# Patient Record
Sex: MALE | Race: WHITE | NOT HISPANIC OR LATINO | Employment: UNEMPLOYED | URBAN - METROPOLITAN AREA
[De-identification: names, ages, dates, MRNs, and addresses within clinical notes are randomized per-mention and may not be internally consistent; named-entity substitution may affect disease eponyms.]

---

## 2017-02-21 ENCOUNTER — ALLSCRIPTS OFFICE VISIT (OUTPATIENT)
Dept: OTHER | Facility: OTHER | Age: 57
End: 2017-02-21

## 2017-03-16 ENCOUNTER — GENERIC CONVERSION - ENCOUNTER (OUTPATIENT)
Dept: OTHER | Facility: OTHER | Age: 57
End: 2017-03-16

## 2017-03-28 ENCOUNTER — ALLSCRIPTS OFFICE VISIT (OUTPATIENT)
Dept: OTHER | Facility: OTHER | Age: 57
End: 2017-03-28

## 2017-03-28 DIAGNOSIS — R53.83 OTHER FATIGUE: ICD-10-CM
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
## 2017-09-18 ENCOUNTER — GENERIC CONVERSION - ENCOUNTER (OUTPATIENT)
Dept: OTHER | Facility: OTHER | Age: 57
End: 2017-09-18

## 2017-09-18 DIAGNOSIS — Z12.5 ENCOUNTER FOR SCREENING FOR MALIGNANT NEOPLASM OF PROSTATE: ICD-10-CM

## 2017-09-18 DIAGNOSIS — I10 ESSENTIAL (PRIMARY) HYPERTENSION: ICD-10-CM

## 2017-09-18 DIAGNOSIS — E78.00 PURE HYPERCHOLESTEROLEMIA: ICD-10-CM

## 2018-01-11 NOTE — PROGRESS NOTES
Assessment    1  Neck pain (723 1) (M54 2)   2  Benign essential hypertension (401 1) (I10)    Plan  Benign essential hypertension    · Gabapentin 300 MG Oral Capsule; TAKE 1 CAPSULE TWICE DAILY   · *1 - [facility] Physical Therapy  Consult  Status: Hold For - Scheduling  Requested for:  21Apr2016  Care Summary provided  : Yes   · Follow-up visit in 1 month Evaluation and Treatment  Follow-up  Status: Hold For - Scheduling   Requested for: 21Apr2016    Discussion/Summary  Discussion Summary:   Awaiting MRI report  add gabapentin ,PT  Counseling Documentation With Imm: The patient was counseled regarding diagnostic results, instructions for management, risk factor reductions, prognosis  Chief Complaint  Chief Complaint Free Text Note Form: Pt is here for a 1 wk f/u for neck pain  Pt had an MRI last night @ [facility] - pt has a CD  Pt is still having numbness and tingling  Meds have helped with the pain  History of Present Illness  Neck Pain (Follow-Up): The patient is being seen for follow-up of neck pain and cervical disc disease  The patient reports no change in the condition  There are no comorbid illnesses  Interval symptoms:  improved neck pain, improved neck stiffness, denies headache, worsened upper extremity pain, worsened upper extremity paresthesias and stable   Associated symptoms: no incontinence  Medications:  the patient is adherent to his medication regimen, but he denies medication side effects  Review of Systems  Complete-Male:   Constitutional: feeling tired  Eyes: No complaints of eye pain, no red eyes, no discharge from eyes, no itchy eyes  ENT: no complaints of earache, no hearing loss, no nosebleeds, no nasal discharge, no sore throat, no hoarseness  Cardiovascular: No complaints of slow heart rate, no fast heart rate, no chest pain, no palpitations, no leg claudication, no lower extremity     Respiratory: No complaints of shortness of breath, no wheezing, no cough, no SOB on exertion, no orthopnea or PND  Gastrointestinal: No complaints of abdominal pain, no constipation, no nausea or vomiting, no diarrhea or bloody stools  Musculoskeletal: as noted in HPI  Neurological: numbness, tingling and limb weakness  Psychiatric: Is not suicidal, no sleep disturbances, no anxiety or depression, no change in personality, no emotional problems  Active Problems    1  Accelerated essential hypertension (401 0) (I10)   2  Benign essential hypertension (401 1) (I10)   3  Bilateral knee pain (719 46) (M25 561,M25 562)   4  Headache (784 0) (R51)   5  Hypercholesterolemia (272 0) (E78 0)   6  Neck pain (723 1) (M54 2)   7  Need for prophylactic vaccination and inoculation against influenza (V04 81) (Z23)   8  Screening for depression (V79 0) (Z13 89)    Past Medical History    1  History of Colonoscopy (Fiberoptic) Screening   2  History of Hearing Loss (389 9)   3  History of influenza (V12 09) (Z87 09)  Active Problems And Past Medical History Reviewed: The active problems and past medical history were reviewed and updated today  Surgical History    1  History of Hernia Repair  Surgical History Reviewed: The surgical history was reviewed and updated today  Family History  Mother    1  Family history of Diabetes  Grandfather    2  Family history of Diabetes  Family History    3  Family history of Denial Of Any Significant Medical History  Family History Reviewed: The family history was reviewed and updated today  Social History    · Denied: History of Alcohol Use (History)   · Denied: History of Drug Use   · Never A Smoker  Social History Reviewed: The social history was reviewed and updated today  Current Meds   1  Methocarbamol 500 MG Oral Tablet; TAKE 1 TABLET 3 TIMES DAILY; Therapy: 14Apr2016 to (Complete:[id number])  Requested for: 14Apr2016; Last Rx:14Apr2016   Ordered   2   Nabumetone 750 MG Oral Tablet; TAKE 1 TABLET EVERY 12 HOURS DAILY; Therapy: 14Apr2016 to (Evaluate:86Khd9561)  Requested for: 14Apr2016; Last Rx:14Apr2016   Ordered   3  Valsartan 80 MG Oral Tablet; TAKE 1 TABLET DAILY; Therapy: 91NME9578 to (Evaluate:11Jun2016)  Requested for: 13YWT1711; Last Rx:10Goo8872   Ordered  Medication List Reviewed: The medication list was reviewed and updated today  Allergies    1  Lisinopril TABS    2  No Known Environmental Allergies   3  No Known Food Allergies    Vitals  Vital Signs [Data Includes: Current Encounter]    Recorded: 21Apr2016 09:53AM   Temperature 98 5 F, Oral   Heart Rate 72   Systolic 516, LUE, Sitting   Diastolic 88, LUE, Sitting   BP Cuff Size Large   Height 6 ft 4 in   Weight 311 lb 4 oz   BMI Calculated 37 89   BSA Calculated 2 67   O2 Saturation 98, RA     Physical Exam    Eyes   Conjunctiva and lids: No swelling, erythema, or discharge  Ears, Nose, Mouth, and Throat   External inspection of ears and nose: Normal     Oropharynx: Normal with no erythema, edema, exudate or lesions  Pulmonary   Respiratory effort: No increased work of breathing or signs of respiratory distress  Auscultation of lungs: Clear to auscultation, equal breath sounds bilaterally, no wheezes, no rales, no rhonci  Cardiovascular   Auscultation of heart: Normal rate and rhythm, normal S1 and S2, without murmurs  Examination of extremities for edema and/or varicosities: Normal     Carotid pulses: Normal     Abdomen   Abdomen: Non-tender, no masses  Musculoskeletal   Gait and station: Normal   limited neck movement  tenderness over base of neck  decrease strength of lt  hand  Neurologic   Cranial nerves: Cranial nerves 2-12 intact  Reflexes: Abnormal   Deep tendon reflexes: 3+ left biceps and 3+ left brachioradialis  Psychiatric   Orientation to person, place and time: Normal          Health Management  Screening for depression   *VB-Depression Screening; every 1 year; Last [id number];  Next Due: 28IJA7208; Active    Future Appointments    Date/Time Provider Specialty Site   04/25/2016 01:30 PM [facility]     Signatures   Electronically signed by :  Christina Thorne MD; Apr 21 2016 10:16AM EST                       (Author)

## 2018-01-12 VITALS
HEART RATE: 73 BPM | BODY MASS INDEX: 36.47 KG/M2 | SYSTOLIC BLOOD PRESSURE: 120 MMHG | HEIGHT: 76 IN | DIASTOLIC BLOOD PRESSURE: 80 MMHG | OXYGEN SATURATION: 98 % | WEIGHT: 299.5 LBS | TEMPERATURE: 97.1 F

## 2018-01-13 VITALS
DIASTOLIC BLOOD PRESSURE: 94 MMHG | WEIGHT: 301 LBS | HEART RATE: 65 BPM | HEIGHT: 76 IN | TEMPERATURE: 97 F | OXYGEN SATURATION: 98 % | SYSTOLIC BLOOD PRESSURE: 148 MMHG | BODY MASS INDEX: 36.65 KG/M2

## 2018-01-22 VITALS
OXYGEN SATURATION: 98 % | TEMPERATURE: 97.9 F | BODY MASS INDEX: 38.36 KG/M2 | WEIGHT: 315 LBS | HEIGHT: 76 IN | HEART RATE: 67 BPM | SYSTOLIC BLOOD PRESSURE: 132 MMHG | DIASTOLIC BLOOD PRESSURE: 80 MMHG

## 2018-04-06 ENCOUNTER — OFFICE VISIT (OUTPATIENT)
Dept: INTERNAL MEDICINE CLINIC | Facility: CLINIC | Age: 58
End: 2018-04-06
Payer: COMMERCIAL

## 2018-04-06 VITALS
HEART RATE: 84 BPM | OXYGEN SATURATION: 95 % | SYSTOLIC BLOOD PRESSURE: 136 MMHG | WEIGHT: 315 LBS | TEMPERATURE: 98.1 F | DIASTOLIC BLOOD PRESSURE: 88 MMHG | BODY MASS INDEX: 39.17 KG/M2 | HEIGHT: 75 IN

## 2018-04-06 DIAGNOSIS — I10 BENIGN ESSENTIAL HYPERTENSION: ICD-10-CM

## 2018-04-06 DIAGNOSIS — E78.5 DYSLIPIDEMIA: ICD-10-CM

## 2018-04-06 DIAGNOSIS — I10 ESSENTIAL HYPERTENSION: Primary | ICD-10-CM

## 2018-04-06 DIAGNOSIS — R91.1 INCIDENTAL LUNG NODULE, > 3MM AND < 8MM: ICD-10-CM

## 2018-04-06 DIAGNOSIS — J01.90 ACUTE NON-RECURRENT SINUSITIS, UNSPECIFIED LOCATION: ICD-10-CM

## 2018-04-06 DIAGNOSIS — R35.1 NOCTURIA: ICD-10-CM

## 2018-04-06 DIAGNOSIS — K42.9 UMBILICAL HERNIA WITHOUT OBSTRUCTION AND WITHOUT GANGRENE: ICD-10-CM

## 2018-04-06 DIAGNOSIS — E66.01 OBESITY, CLASS III, BMI 40-49.9 (MORBID OBESITY) (HCC): ICD-10-CM

## 2018-04-06 DIAGNOSIS — R79.89 ELEVATED LIVER FUNCTION TESTS: ICD-10-CM

## 2018-04-06 PROBLEM — R53.83 FATIGUE: Status: RESOLVED | Noted: 2017-03-28 | Resolved: 2018-04-06

## 2018-04-06 PROBLEM — R53.83 FATIGUE: Status: ACTIVE | Noted: 2017-03-28

## 2018-04-06 PROCEDURE — 99214 OFFICE O/P EST MOD 30 MIN: CPT | Performed by: PHYSICIAN ASSISTANT

## 2018-04-06 RX ORDER — FLUTICASONE PROPIONATE 50 MCG
1 SPRAY, SUSPENSION (ML) NASAL DAILY
Qty: 16 G | Refills: 0 | Status: SHIPPED | OUTPATIENT
Start: 2018-04-06

## 2018-04-06 RX ORDER — AMOXICILLIN AND CLAVULANATE POTASSIUM 875; 125 MG/1; MG/1
1 TABLET, FILM COATED ORAL EVERY 12 HOURS SCHEDULED
Qty: 20 TABLET | Refills: 0 | Status: SHIPPED | OUTPATIENT
Start: 2018-04-06 | End: 2018-04-16

## 2018-04-06 RX ORDER — VALSARTAN 160 MG/1
1 TABLET ORAL DAILY
COMMUNITY
Start: 2015-06-18 | End: 2018-04-06 | Stop reason: SDUPTHER

## 2018-04-06 RX ORDER — HYDROCHLOROTHIAZIDE 12.5 MG/1
12.5 TABLET ORAL DAILY
Qty: 90 TABLET | Refills: 1 | Status: SHIPPED | OUTPATIENT
Start: 2018-04-06

## 2018-04-06 RX ORDER — HYDROCHLOROTHIAZIDE 12.5 MG/1
1 TABLET ORAL DAILY
COMMUNITY
Start: 2017-02-21 | End: 2018-04-06 | Stop reason: SDUPTHER

## 2018-04-06 RX ORDER — VALSARTAN 160 MG/1
160 TABLET ORAL DAILY
Qty: 90 TABLET | Refills: 1 | Status: SHIPPED | OUTPATIENT
Start: 2018-04-06 | End: 2018-09-07 | Stop reason: ALTCHOICE

## 2018-04-06 NOTE — ASSESSMENT & PLAN NOTE
Previous CT scan of the chest was done showing a 3 mm right middle lobe nodule  Patient is a nonsmoker however does have previous exposure to asbestos remotely and previously had been in the [employer] for that reason will proceed CT to check stability

## 2018-04-06 NOTE — PROGRESS NOTES
[facility]  Standard Office Visit  Patient ID: Duy Rivers    : 1960  Age/Gender: 56 y o  male     DATE: 2018      Assessment/Plan:    Dyslipidemia  Encourage weight loss  Low-fat low-cholesterol diet  ASCVD score 7 2%  Not currently taking a statin  Discussed in addition to diet and lifestyle can use over-the-counter red yeast rice which is an over-the-counter supplement which can help improve cholesterol levels  Incidental lung nodule, > 3mm and < 8mm    Previous CT scan of the chest was done showing a 3 mm right middle lobe nodule  Patient is a nonsmoker however does have previous exposure to asbestos remotely and previously had been in the [employer] for that reason will proceed CT to check stability  Acute non-recurrent sinusitis  Start Augmentin to be taken twice daily as directed  Take in AM with food  Start Flonase 1 spray to each nostril each daily  Benign essential hypertension   Blood pressure well controlled at this time on hydrochlorothiazide with valsartan  No dose change  Encourage weight loss daily activity  Continue to keep regular scheduled cardiology follow-up    Nocturia    Patient follows regularly with urologist   He will keep our office informed of any change in treatment plan    Obesity, Class III, BMI 40-49 9 (morbid obesity) ([address] )   Weight loss can help improve blood pressure and cholesterol control  Umbilical hernia    No signs or symptoms of acute obstruction  Discussed surgical intervention which patient is not interested at this time  Discussed with patient red flag symptoms   Which need immediate evaluation and treatment should they develop  Increase fiber and fluids in your diet    Elevated liver function tests   Add chronic hepatitis panel the next lab set  Likely secondary to fatty liver    Will check CMP as well if continues to be elevated will check right upper quadrant ultrasound       Diagnoses and all orders for this visit:    Essential hypertension  -     hydrochlorothiazide (HYDRODIURIL) 12 5 mg tablet; Take 1 tablet (12 5 mg total) by mouth daily  -     valsartan (DIOVAN) 160 mg tablet; Take 1 tablet (160 mg total) by mouth daily    Benign essential hypertension  -     Comprehensive metabolic panel; Future    Dyslipidemia  -     Comprehensive metabolic panel; Future  -     Lipid panel; Future    Obesity, Class III, BMI 40-49 9 (morbid obesity) (HCC)    Incidental lung nodule, > 3mm and < 8mm  -     CT chest wo contrast; Future    Elevated liver function tests  -     Chronic Hepatitis Panel; Future    Acute non-recurrent sinusitis, unspecified location  -     amoxicillin-clavulanate (AUGMENTIN) 875-125 mg per tablet; Take 1 tablet by mouth every 12 (twelve) hours for 10 days  -     fluticasone (FLONASE) 50 mcg/act nasal spray; 1 spray into each nostril daily    Umbilical hernia without obstruction and without gangrene    Nocturia    Other orders  -     aspirin 81 MG tablet; Take 1 tablet by mouth daily  -     Discontinue: hydrochlorothiazide (HYDRODIURIL) 12 5 mg tablet; Take 1 tablet by mouth daily  -     Discontinue: valsartan (DIOVAN) 160 mg tablet; Take 1 tablet by mouth daily          Subjective:   Chief Complaint   Patient presents with    Follow-up     BLOOD WORK DONE 04/03/2018    Hypertension         Brody Roland is a 56 y o  male who presents to the office on 4/6/2018 for     57 yo male for follow up  Notes he has a scheduled cardiology follow-up  Notes that his children moved in with him and brought his grandchildren and this winter he has had multiple colds  Most recently has been for 3-4 weeks with sinus pain and pressure  Nasal congestion   Taking blood pressure medications as directed  No new complaints or concerns   Other than his cold  Does have a history of being in the [employer] as well as expects this exposure  He is a nonsmoker    History of lung nodule  Hypertension   This is a chronic problem  The current episode started more than 1 year ago  The problem has been resolved since onset  The problem is controlled  Associated symptoms include headaches  Pertinent negatives include no anxiety, blurred vision, chest pain, palpitations, peripheral edema or shortness of breath  Risk factors for coronary artery disease include male gender, obesity and dyslipidemia  The current treatment provides moderate improvement  There are no compliance problems  There is no history of angina or CAD/MI  Sinusitis   This is a recurrent problem  The current episode started 1 to 4 weeks ago  The problem has been waxing and waning since onset  There has been no fever  The pain is mild  Associated symptoms include ear pain, headaches, sinus pressure and a sore throat  Pertinent negatives include no chills, coughing or shortness of breath  The following portions of the patient's history were reviewed and updated as appropriate: allergies, current medications, past family history, past medical history, past social history, past surgical history and problem list     Review of Systems   Constitutional: Negative for chills  HENT: Positive for ear pain, sinus pressure and sore throat  Eyes: Negative for blurred vision  Respiratory: Negative for cough and shortness of breath  Cardiovascular: Negative for chest pain and palpitations  Follows with Cardiology  Has follow-up scheduled  Gastrointestinal: Negative for abdominal distention, constipation, diarrhea, nausea and vomiting  He notes he is up-to-date on his colonoscopy   Neurological: Positive for headaches           Patient Active Problem List   Diagnosis    Abnormal stress test    Benign essential hypertension    Dyslipidemia    EKG, abnormal    Incidental lung nodule, > 3mm and < 8mm    Obesity, Class III, BMI 40-49 9 (morbid obesity) ([facility])    Nocturia    Positive cardiac stress test  Acute non-recurrent sinusitis    Umbilical hernia    Elevated liver function tests       Past Medical History:   Diagnosis Date    Accelerated essential hypertension     last assessed 3/3/15, resolved 11/25/16    GERD (gastroesophageal reflux disease)     Hearing loss     last assessed 1/20/14, resolved 6/17/15    Obesity     Shingles        Past Surgical History:   Procedure Laterality Date    COLONOSCOPY      last assessed 3/31/14, resolved 6/17/15    HERNIA REPAIR      WISDOM TOOTH EXTRACTION           Current Outpatient Prescriptions:     aspirin 81 MG tablet, Take 1 tablet by mouth daily, Disp: , Rfl:     hydrochlorothiazide (HYDRODIURIL) 12 5 mg tablet, Take 1 tablet (12 5 mg total) by mouth daily, Disp: 90 tablet, Rfl: 1    valsartan (DIOVAN) 160 mg tablet, Take 1 tablet (160 mg total) by mouth daily, Disp: 90 tablet, Rfl: 1    amoxicillin-clavulanate (AUGMENTIN) 875-125 mg per tablet, Take 1 tablet by mouth every 12 (twelve) hours for 10 days, Disp: 20 tablet, Rfl: 0    fluticasone (FLONASE) 50 mcg/act nasal spray, 1 spray into each nostril daily, Disp: 16 g, Rfl: 0    Allergies   Allergen Reactions    Lisinopril Cough       Social History     Social History    Marital status: /Civil Union     Spouse name: N/A    Number of children: N/A    Years of education: N/A     Social History Main Topics    Smoking status: Never Smoker    Smokeless tobacco: Never Used    Alcohol use No    Drug use: No    Sexual activity: Not Asked     Other Topics Concern    None     Social History Narrative    None       Family History   Problem Relation Age of Onset    Hypertension Mother     No Known Problems Father     Diabetes Family        PHQ-9 Depression Screening    PHQ-9:    Frequency of the following problems over the past two weeks:              Health Maintenance   Topic Date Due    HIV SCREENING  1960    Hepatitis C Screening  1960    COLONOSCOPY  1960    Depression Screening PHQ-9  11/21/1974    DTaP,Tdap,and Td Vaccines (2 - Td) 02/21/2027    INFLUENZA VACCINE  Completed       Immunization History   Administered Date(s) Administered    Influenza 01/05/2015, 10/28/2015, 11/19/2016, 11/25/2016    Influenza Quadrivalent Preservative Free 3 years and older IM 11/25/2016, 09/18/2017    Influenza Quadrivalent, 6-35 Months IM 01/05/2015    Influenza TIV (IM) 10/28/2015    Tdap 02/21/2017        Objective:  Vitals:    04/06/18 1325   BP: 136/88   BP Location: Left arm   Patient Position: Sitting   Cuff Size: Large   Pulse: 84   Temp: 98 1 °F (36 7 °C)   TempSrc: Oral   SpO2: 95%   Weight: (!) 146 kg (322 lb 9 6 oz)   Height: 6' 3" (1 905 m)     Wt Readings from Last 3 Encounters:   04/06/18 (!) 146 kg (322 lb 9 6 oz)   09/18/17 (!) 145 kg (320 lb)   03/28/17 136 kg (299 lb 8 oz)     Body mass index is 40 32 kg/m²  No exam data present       Physical Exam   Constitutional: He is oriented to person, place, and time  He appears well-developed and well-nourished  No distress  Alert pleasant cooperative seated in room in no acute distress   HENT:   Head: Normocephalic and atraumatic  Mouth/Throat: No oropharyngeal exudate  Mild erythema of tonsils and posterior pharynx  No exudates  Right tonsillar asymmetry cryptic  No tonsil stones  Airway patent  Yellow clear nasal drainage with edema of nasal mucosa  Tenderness to palpation over maxillary sinuses  No frontal sinus tenderness to palpation  Extraocular eye movements intact  Eyes: EOM are normal  Pupils are equal, round, and reactive to light  Right eye exhibits no discharge  Left eye exhibits no discharge  No scleral icterus  Neck: Neck supple  Cardiovascular: Normal rate, regular rhythm and normal heart sounds  No murmur heard  Pulmonary/Chest: Effort normal and breath sounds normal  No respiratory distress  He has no wheezes     Clear to auscultation no crackles rhonchi wheeze   Abdominal: Soft  Bowel sounds are normal  There is no tenderness  There is no guarding  Soft nontender positive bowel sounds  Umbilical hernia normal overlying skin, nontender, positive bowel sounds  x4   Musculoskeletal: He exhibits no edema  No lower extremity edema   Neurological: He is alert and oriented to person, place, and time  Skin: Skin is warm and dry  He is not diaphoretic  Psychiatric: He has a normal mood and affect  His behavior is normal  Judgment and thought content normal    Nursing note and vitals reviewed            Future Appointments  Date Time Provider Isidra Jennifer   9/7/2018 1:00 PM Feliberto Jimenez PA-C [address] [place] [address]    Patient Care Team:  Lakesha Alegria MD as PCP - General  Feliberto Jimenez PA-C

## 2018-04-06 NOTE — ASSESSMENT & PLAN NOTE
Start Augmentin to be taken twice daily as directed  Take in AM with food  Start Flonase 1 spray to each nostril each daily

## 2018-04-06 NOTE — ASSESSMENT & PLAN NOTE
Encourage weight loss  Low-fat low-cholesterol diet  ASCVD score 7 2%  Not currently taking a statin  Discussed in addition to diet and lifestyle can use over-the-counter red yeast rice which is an over-the-counter supplement which can help improve cholesterol levels

## 2018-04-07 NOTE — ASSESSMENT & PLAN NOTE
Blood pressure well controlled at this time on hydrochlorothiazide with valsartan  No dose change  Encourage weight loss daily activity    Continue to keep regular scheduled cardiology follow-up

## 2018-04-07 NOTE — ASSESSMENT & PLAN NOTE
No signs or symptoms of acute obstruction  Discussed surgical intervention which patient is not interested at this time  Discussed with patient red flag symptoms   Which need immediate evaluation and treatment should they develop    Increase fiber and fluids in your diet

## 2018-04-07 NOTE — ASSESSMENT & PLAN NOTE
Patient follows regularly with urologist   He will keep our office informed of any change in treatment plan

## 2018-04-07 NOTE — ASSESSMENT & PLAN NOTE
Add chronic hepatitis panel the next lab set  Likely secondary to fatty liver    Will check CMP as well if continues to be elevated will check right upper quadrant ultrasound

## 2018-09-07 ENCOUNTER — OFFICE VISIT (OUTPATIENT)
Dept: INTERNAL MEDICINE CLINIC | Facility: CLINIC | Age: 58
End: 2018-09-07
Payer: COMMERCIAL

## 2018-09-07 VITALS
WEIGHT: 315 LBS | BODY MASS INDEX: 39.17 KG/M2 | DIASTOLIC BLOOD PRESSURE: 82 MMHG | TEMPERATURE: 97.9 F | HEART RATE: 77 BPM | OXYGEN SATURATION: 97 % | SYSTOLIC BLOOD PRESSURE: 116 MMHG | HEIGHT: 75 IN

## 2018-09-07 DIAGNOSIS — E78.5 DYSLIPIDEMIA: ICD-10-CM

## 2018-09-07 DIAGNOSIS — E66.01 OBESITY, CLASS III, BMI 40-49.9 (MORBID OBESITY) (HCC): ICD-10-CM

## 2018-09-07 DIAGNOSIS — Z78.9 NOT IMMUNE TO HEPATITIS B VIRUS: ICD-10-CM

## 2018-09-07 DIAGNOSIS — R91.1 INCIDENTAL LUNG NODULE, > 3MM AND < 8MM: ICD-10-CM

## 2018-09-07 DIAGNOSIS — I10 BENIGN ESSENTIAL HYPERTENSION: Primary | ICD-10-CM

## 2018-09-07 DIAGNOSIS — Z23 NEEDS FLU SHOT: ICD-10-CM

## 2018-09-07 DIAGNOSIS — R79.89 ELEVATED LIVER FUNCTION TESTS: ICD-10-CM

## 2018-09-07 PROBLEM — J01.90 ACUTE NON-RECURRENT SINUSITIS: Status: RESOLVED | Noted: 2018-04-06 | Resolved: 2018-09-07

## 2018-09-07 PROCEDURE — 90682 RIV4 VACC RECOMBINANT DNA IM: CPT | Performed by: PHYSICIAN ASSISTANT

## 2018-09-07 PROCEDURE — 99214 OFFICE O/P EST MOD 30 MIN: CPT | Performed by: PHYSICIAN ASSISTANT

## 2018-09-07 PROCEDURE — 90471 IMMUNIZATION ADMIN: CPT | Performed by: PHYSICIAN ASSISTANT

## 2018-09-07 RX ORDER — LOSARTAN POTASSIUM 50 MG/1
50 TABLET ORAL DAILY
Qty: 30 TABLET | Refills: 5 | Status: SHIPPED | OUTPATIENT
Start: 2018-09-07

## 2018-09-07 NOTE — ASSESSMENT & PLAN NOTE
Patient recently seen by Cardiology who switched him from valsartan to losartan  Blood pressure well controlled on losartan 50 mg along with hydrochlorothiazide 12 5 mg   Encouraged him to continue with weight loss  Gave encouragement

## 2018-09-07 NOTE — ASSESSMENT & PLAN NOTE
ASCVD score 6 3, encourage weight loss, low fat low cholesterol diet  Discussed OTC herbal red yeast rise supplement which can help with cholesterol levels

## 2018-09-07 NOTE — ASSESSMENT & PLAN NOTE
Reviewed laboratory studies  Hepatitis C negative as patient is in the [facility] population  No further hepatitis C testing is needed  Incidentally noted patient does not appear to be an immune from hepatitis B  He was in the [facility] and presumably has received the vaccination in the past however did not develop immunity  Discussed relax in a shin with patient  Patient notes he will check with his insurance provider as well as his  Wife prior to proceeding with re-vaccination    Discussed hepatitis-B transmission roots and risk factors

## 2018-09-07 NOTE — PATIENT INSTRUCTIONS
Elevated liver function tests  Ast alt and alk phos normal   Will resolved this issue  Will follow LFTs  Not immune to hepatitis B virus  Reviewed laboratory studies  Hepatitis C negative as patient is in the [place] population  No further hepatitis C testing is needed  Incidentally noted patient does not appear to be an immune from hepatitis B  He was in the [facility] and presumably has received the vaccination in the past however did not develop immunity  Discussed relax in a shin with patient  Patient notes he will check with his insurance provider as well as his  Wife prior to proceeding with re-vaccination  Discussed hepatitis-B transmission roots and risk factors    Incidental lung nodule, > 3mm and < 8mm  Repeat CT chest show stable lung nodule  Will follow    Dyslipidemia  ASCVD score 6 3, encourage weight loss, low fat low cholesterol diet  Discussed OTC herbal red yeast rise supplement which can help with cholesterol levels

## 2018-09-07 NOTE — PROGRESS NOTES
Ingrid Sellers [facility]  Standard Office Visit  Patient ID: Anamaria Mack    : 1960  Age/Gender: 56 y o  male     DATE: 2018      Assessment/Plan:    Elevated liver function tests  Ast alt and alk phos normal   Will resolved this issue  Will follow LFTs  Not immune to hepatitis B virus  Reviewed laboratory studies  Hepatitis C negative as patient is in the baby [place] population  No further hepatitis C testing is needed  Incidentally noted patient does not appear to be an immune from hepatitis B  He was in the [employer] and presumably has received the vaccination in the past however did not develop immunity  Discussed relax in a shin with patient  Patient notes he will check with his insurance provider as well as his  Wife prior to proceeding with re-vaccination  Discussed hepatitis-B transmission roots and risk factors    Incidental lung nodule, > 3mm and < 8mm  Repeat CT chest show stable lung nodule  Will follow    Dyslipidemia  ASCVD score 6 3, encourage weight loss, low fat low cholesterol diet  Discussed OTC herbal red yeast rise supplement which can help with cholesterol levels  Needs flu shot  Flu shot given today  Benign essential hypertension   Patient recently seen by Cardiology who switched him from valsartan to losartan  Blood pressure well controlled on losartan 50 mg along with hydrochlorothiazide 12 5 mg   Encouraged him to continue with weight loss  Gave encouragement  Diagnoses and all orders for this visit:    Benign essential hypertension  -     losartan (COZAAR) 50 mg tablet; Take 1 tablet (50 mg total) by mouth daily  -     Comprehensive metabolic panel; Future    Dyslipidemia  -     Lipid panel;  Future    Incidental lung nodule, > 3mm and < 8mm    Obesity, Class III, BMI 40-49 9 (morbid obesity) (HCC)    Elevated liver function tests    Not immune to hepatitis B virus    Needs flu shot Subjective:   Chief Complaint   Patient presents with    Hypertension     review labs and ct          Haroldo Hudson is a 56 y o  male who presents to the office on 9/7/2018 for      Follow-up  Patient recently had labs  He saw his cardiologist recently who switched him from  Valsartan to losartan  He has been taking losartan with hydrochlorothiazide as directed with good control  He notes his wife recently has lost a significant  Amount of weight through diet and lifestyle which has motivated him to lose weight himself  He recently got rid of snacks in the home and is trying to eat more healthy  He is taking all medications as directed  He notes his last visit with his cardiologist went well  He recently had a CT scan of his chest as well to follow up for lung nodules  He does not have a smoking history however does have a history of   experience with questionable asbestos  Exposure history  He follows regularly with his urologist   No longer having any urinary symptoms  moving his bowels regularly  Notes he does follow regularly for his colonoscopy  Does have an  Umbilical hernia but is not causing him any difficulties  No new complaints or concerns at this time  Feeling well  Wants to know if we have the flu shot in      Hypertension   This is a recurrent problem  The current episode started more than 1 year ago  The problem is controlled  Pertinent negatives include no anxiety, chest pain, headaches, palpitations, peripheral edema or shortness of breath  Risk factors for coronary artery disease include male gender, obesity and dyslipidemia  Past treatments include angiotensin blockers and diuretics  The current treatment provides significant improvement  There is no history of kidney disease or CAD/MI  There is no history of chronic renal disease         The following portions of the patient's history were reviewed and updated as appropriate: allergies, current medications, past family history, past medical history, past social history, past surgical history and problem list     Review of Systems   Respiratory: Negative for cough, shortness of breath and wheezing  Cardiovascular: Negative for chest pain and palpitations  Gastrointestinal: Negative for abdominal pain, diarrhea, nausea and vomiting  Genitourinary: Negative for dysuria  Skin: Negative for rash  Neurological: Negative for dizziness, light-headedness and headaches           Patient Active Problem List   Diagnosis    Abnormal stress test    Benign essential hypertension    Dyslipidemia    EKG, abnormal    Incidental lung nodule, > 3mm and < 8mm    Obesity, Class III, BMI 40-49 9 (morbid obesity) (HCC)    Nocturia    Positive cardiac stress test    Umbilical hernia    Not immune to hepatitis B virus    Needs flu shot       Past Medical History:   Diagnosis Date    Accelerated essential hypertension     last assessed 3/3/15, resolved 11/25/16    GERD (gastroesophageal reflux disease)     Hearing loss     last assessed 1/20/14, resolved 6/17/15    Obesity     Shingles        Past Surgical History:   Procedure Laterality Date    COLONOSCOPY      last assessed 3/31/14, resolved 6/17/15    HERNIA REPAIR      WISDOM TOOTH EXTRACTION           Current Outpatient Prescriptions:     aspirin 81 MG tablet, Take 1 tablet by mouth daily, Disp: , Rfl:     fluticasone (FLONASE) 50 mcg/act nasal spray, 1 spray into each nostril daily, Disp: 16 g, Rfl: 0    hydrochlorothiazide (HYDRODIURIL) 12 5 mg tablet, Take 1 tablet (12 5 mg total) by mouth daily, Disp: 90 tablet, Rfl: 1    losartan (COZAAR) 50 mg tablet, Take 1 tablet (50 mg total) by mouth daily, Disp: 30 tablet, Rfl: 5    Allergies   Allergen Reactions    Lisinopril Cough       Social History     Social History    Marital status: /Civil Union     Spouse name: N/A    Number of children: N/A    Years of education: N/A     Social History Main Topics  Smoking status: Never Smoker    Smokeless tobacco: Never Used    Alcohol use No    Drug use: No    Sexual activity: Not Asked     Other Topics Concern    None     Social History Narrative    None       Family History   Problem Relation Age of Onset    Hypertension Mother     No Known Problems Father     Diabetes Family        PHQ-9 Depression Screening    PHQ-9:    Frequency of the following problems over the past two weeks:       Little interest or pleasure in doing things:  0 - not at all  Feeling down, depressed, or hopeless:  0 - not at all  PHQ-2 Score:  0         Health Maintenance   Topic Date Due    Depression Screening PHQ  1960    INFLUENZA VACCINE  09/01/2018    CRC Screening: Colonoscopy  12/12/2024    DTaP,Tdap,and Td Vaccines (2 - Td) 02/21/2027       Immunization History   Administered Date(s) Administered    Influenza 01/05/2015, 10/28/2015, 11/19/2016, 11/25/2016    Influenza Quadrivalent Preservative Free 3 years and older IM 11/25/2016, 09/18/2017    Influenza Quadrivalent, 6-35 Months IM 01/05/2015    Influenza TIV (IM) 10/28/2015    Tdap 02/21/2017        Objective:  Vitals:    09/07/18 1301   BP: 116/82   BP Location: Right arm   Patient Position: Sitting   Cuff Size: Large   Pulse: 77   Temp: 97 9 °F (36 6 °C)   TempSrc: Oral   SpO2: 97%   Weight: (!) 145 kg (320 lb)   Height: 6' 3" (1 905 m)     Wt Readings from Last 3 Encounters:   09/07/18 (!) 145 kg (320 lb)   04/06/18 (!) 146 kg (322 lb 9 6 oz)   09/18/17 (!) 145 kg (320 lb)     Body mass index is 40 kg/m²  No exam data present       Physical Exam   Constitutional: He is oriented to person, place, and time  He appears well-developed and well-nourished  No distress  Alert pleasant cooperative  Seated in room in no acute distress   HENT:   Head: Normocephalic and atraumatic  Mouth/Throat: Oropharynx is clear and moist  No oropharyngeal exudate  Eyes: Pupils are equal, round, and reactive to light   Right eye exhibits no discharge  Left eye exhibits no discharge  No scleral icterus  Neck: Neck supple  Cardiovascular: Normal rate, regular rhythm and normal heart sounds  No murmur heard  Pulmonary/Chest: Effort normal and breath sounds normal  No respiratory distress  He has no wheezes  He has no rales  Clear to auscultation throughout  No crackles no rhonchi no wheeze  No respiratory distress   Abdominal: Soft  Bowel sounds are normal  There is no tenderness  Soft nontender nondistended  Positive bowel sounds  Positive umbilical hernia  Nontender   Musculoskeletal: He exhibits no edema  No lower extremity edema   Neurological: He is alert and oriented to person, place, and time  No gross focal neurologic deficits   Skin: Skin is warm and dry  No rash noted  He is not diaphoretic  Psychiatric: He has a normal mood and affect  His behavior is normal  Thought content normal    Nursing note and vitals reviewed            Future Appointments  Date Time Provider Isidra Jennifer   4/5/2019 2:15 PM Rei Harmon PA-C [facility] [address]    Patient Care Team:  Penny Alcazar MD as PCP - General  Rei Harmon PA-C

## 2018-10-26 ENCOUNTER — TELEPHONE (OUTPATIENT)
Dept: INTERNAL MEDICINE CLINIC | Facility: CLINIC | Age: 58
End: 2018-10-26

## 2018-10-26 NOTE — TELEPHONE ENCOUNTER
Wife marti called about a bill they received the charges were for vaccine administration and the office visit  She thought he had maybe been billed for a vaccine he didn't have  I spoke to perla to clarify she said it was flu vaccine and admin charge  Wife cb 3 [phone]  I left a detailed message for marti if she has any questions id be happy to speak to her again